# Patient Record
Sex: FEMALE | Race: OTHER | HISPANIC OR LATINO | Employment: STUDENT | URBAN - METROPOLITAN AREA
[De-identification: names, ages, dates, MRNs, and addresses within clinical notes are randomized per-mention and may not be internally consistent; named-entity substitution may affect disease eponyms.]

---

## 2023-04-14 LAB — URINE CULTURE: NORMAL

## 2023-09-16 LAB
CHLAMYDIA TRACH., AMPLIFIED: NEGATIVE
N. GONORRHEA, AMPLIFIED: NEGATIVE
TRICHOMONAS VAGINALIS: NEGATIVE

## 2023-09-17 LAB
CLUE CELLS: ABNORMAL
NUGENT SCORE: 1
YEAST: PRESENT

## 2024-11-30 ENCOUNTER — OFFICE VISIT (OUTPATIENT)
Dept: URGENT CARE | Age: 24
End: 2024-11-30
Payer: MEDICAID

## 2024-11-30 VITALS
WEIGHT: 190 LBS | BODY MASS INDEX: 31.62 KG/M2 | TEMPERATURE: 98.3 F | DIASTOLIC BLOOD PRESSURE: 63 MMHG | SYSTOLIC BLOOD PRESSURE: 109 MMHG | RESPIRATION RATE: 18 BRPM | OXYGEN SATURATION: 100 % | HEART RATE: 60 BPM

## 2024-11-30 DIAGNOSIS — R10.13 EPIGASTRIC ABDOMINAL PAIN: Primary | ICD-10-CM

## 2024-11-30 NOTE — PROGRESS NOTES
Subjective   Patient ID: Mini Garcia is a 23 y.o. female. They present today with a chief complaint of Other (Pain under both breasts comes and goes /).    History of Present Illness  Patient is a pleasant 23-year-old female, no significant past medical history, presenting to the clinic for chief complaint of now resolved abdominal pain.  Patient states yesterday she developed some pressure in the epigastric region of her abdomen.  She denies any associated nausea, vomiting, diarrhea, chest pain, or shortness of breath.  Is unable to identify any triggering or alleviating factors.  States the pain was relatively constant yesterday and then became intermittent today and is now gone.  She wants to ensure there is nothing serious going on.  She denies any back pain or flank pain.  No vaginal bleeding or discharge.  No urinary complaints including dysuria, frequency, urgency, or hematuria.  She denies any new medications.  No recent travel.  No further complaints.            Past Medical History  Allergies as of 11/30/2024 - Reviewed 11/30/2024   Allergen Reaction Noted    House dust Cough 11/30/2024    Ragweed Cough 11/30/2024    Weed pollen-short ragweed Cough 11/30/2024       (Not in a hospital admission)         Past Medical History:   Diagnosis Date    Acute candidiasis of vulva and vagina 09/07/2014    Candida vaginitis    Acute maxillary sinusitis, unspecified 02/22/2017    Acute maxillary sinusitis    Acute maxillary sinusitis, unspecified 02/11/2016    Acute maxillary sinusitis    Acute sinusitis, unspecified 05/28/2013    Acute sinusitis    Cough, unspecified 02/11/2016    Cough    Dysuria 09/07/2014    Dysuria    Headache, unspecified 11/14/2016    Sinus headache    Irregular menstruation, unspecified 08/21/2014    Irregular menstrual cycle    Medial epicondylitis, right elbow 05/01/2017    Medial epicondylitis of right elbow    Other conditions influencing health status     Menstruation    Other  enthesopathies, not elsewhere classified 03/30/2017    Tendinitis of right elbow    Other specified health status     No pertinent past surgical history    Pain in right elbow 04/03/2017    Right elbow pain    Pain in right elbow 03/30/2017    Pain and swelling of right elbow    Personal history of diseases of the skin and subcutaneous tissue 02/24/2014    History of acne    Personal history of other (healed) physical injury and trauma 09/27/2015    History of sprain of knee    Personal history of other diseases of the digestive system 07/11/2017    History of constipation    Personal history of other diseases of the female genital tract 12/07/2013    History of amenorrhea    Personal history of other diseases of the musculoskeletal system and connective tissue 07/17/2017    History of back pain    Personal history of other diseases of the respiratory system 11/03/2014    History of acute sinusitis    Personal history of other diseases of the respiratory system 03/28/2015    History of pharyngitis    Personal history of other diseases of the respiratory system 12/07/2013    Personal history of acute sinusitis    Personal history of other diseases of the respiratory system 11/14/2016    History of acute bacterial sinusitis    Personal history of other specified conditions 08/21/2014    History of abdominal pain    Personal history of other specified conditions 12/12/2014    History of vertigo    Personal history of other specified conditions 02/06/2014    History of abdominal pain    Personal history of other specified conditions 02/11/2016    History of headache    Personal history of other specified conditions 02/06/2014    History of diarrhea    Sprain of unspecified ligament of unspecified ankle, initial encounter 09/27/2015    Ankle sprain    Suppurative otitis media, unspecified, right ear 02/11/2016    Suppurative otitis media of right ear    Unspecified disorder of refraction 09/29/2014    Refractive error     Unspecified injury of unspecified ankle, initial encounter 09/27/2015    Ankle injury       History reviewed. No pertinent surgical history.    Alcohol use questions deferred to the physician. She reports that she does not use drugs.    Review of Systems  Review of Systems                               Objective    Vitals:    11/30/24 1544   BP: 109/63   Pulse: 60   Resp: 18   Temp: 36.8 °C (98.3 °F)   TempSrc: Oral   SpO2: 100%   Weight: 86.2 kg (190 lb)     No LMP recorded.    Physical Exam  General: Vitals Noted. No distress. Normocephalic.     HEENT: TMs normal, EOMI, normal conjunctiva, patent nares, Normal OP    Neck: Supple with no adenopathy.     Cardiac: Regular Rate and Rhythm. No murmur.     Pulmonary: Equal breath sounds bilaterally. No wheezes, rhonchi, or rales.    Abdomen: Soft, nontender, normoactive bowel sounds. No palpable organomegaly or mass. No rebound or guarding. No CVA tenderness.     Musculoskeletal: Moves all extremities, no effusion, no edema.     Skin: No obvious rashes.  Procedures    Point of Care Test & Imaging Results from this visit    No results found.    Diagnostic study results (if any) were reviewed by Jas Fuentes PA-C.    Assessment/Plan   Allergies, medications, history, and pertinent labs/EKGs/Imaging reviewed by Jas Fuentes PA-C.     Medical Decision Making  Patient was seen eval in the clinic for complaint of epigastric discomfort.  On exam patient is nontoxic well-appearing respite comfortably no acute distress.  Vital signs are stable, afebrile.  Chest is clear, heart is regular, belly is diffusely soft and nontender with no guarding rebound or rigidity.  No CVA tenderness bilaterally.  Based upon the patient's history and physical exam I have very low clinical concern for acute intra-abdominal etiologies including but not limited to cholecystitis, pancreatitis, pyelonephritis.  She is very well-appearing.  No signs of infection including fever or  tachycardia.  I feel we can watch weight and monitor patient's symptoms at home advised plenty of clear fluids and brat diet.  Advise follow-up with her primary care physician in the next week.  Advised to report to the ED for any new or worsening symptoms.  Discharged home at this time.  Reviewed my impression, plan, strict return versus report to ED precautions with the patient.  She expresses understanding and agreement plan of care.    Orders and Diagnoses  There are no diagnoses linked to this encounter.      Medical Admin Record      Follow Up Instructions  No follow-ups on file.    Patient disposition: Home    Electronically signed by Jas Fuentes PA-C  3:51 PM